# Patient Record
Sex: MALE | Race: WHITE | NOT HISPANIC OR LATINO | Employment: UNEMPLOYED | URBAN - METROPOLITAN AREA
[De-identification: names, ages, dates, MRNs, and addresses within clinical notes are randomized per-mention and may not be internally consistent; named-entity substitution may affect disease eponyms.]

---

## 2017-01-10 ENCOUNTER — OFFICE VISIT (OUTPATIENT)
Dept: MEDICAL GROUP | Facility: MEDICAL CENTER | Age: 57
End: 2017-01-10
Attending: NURSE PRACTITIONER
Payer: MEDICAID

## 2017-01-10 VITALS
BODY MASS INDEX: 22.02 KG/M2 | HEART RATE: 66 BPM | WEIGHT: 137 LBS | TEMPERATURE: 98.2 F | HEIGHT: 66 IN | RESPIRATION RATE: 18 BRPM | DIASTOLIC BLOOD PRESSURE: 80 MMHG | OXYGEN SATURATION: 97 % | SYSTOLIC BLOOD PRESSURE: 90 MMHG

## 2017-01-10 DIAGNOSIS — M12.812 LEFT ROTATOR CUFF TEAR ARTHROPATHY: ICD-10-CM

## 2017-01-10 DIAGNOSIS — M75.102 LEFT ROTATOR CUFF TEAR ARTHROPATHY: ICD-10-CM

## 2017-01-10 DIAGNOSIS — G89.4 CHRONIC PAIN SYNDROME: ICD-10-CM

## 2017-01-10 DIAGNOSIS — M25.532 WRIST PAIN, CHRONIC, LEFT: ICD-10-CM

## 2017-01-10 DIAGNOSIS — G89.29 WRIST PAIN, CHRONIC, LEFT: ICD-10-CM

## 2017-01-10 PROBLEM — M25.539 WRIST PAIN, CHRONIC: Status: ACTIVE | Noted: 2017-01-10

## 2017-01-10 PROCEDURE — 99213 OFFICE O/P EST LOW 20 MIN: CPT | Performed by: NURSE PRACTITIONER

## 2017-01-10 RX ORDER — PRAZOSIN HYDROCHLORIDE 2 MG/1
2 CAPSULE ORAL
Refills: 0 | COMMUNITY
Start: 2017-01-06

## 2017-01-10 ASSESSMENT — PATIENT HEALTH QUESTIONNAIRE - PHQ9: CLINICAL INTERPRETATION OF PHQ2 SCORE: 0

## 2017-01-10 NOTE — ASSESSMENT & PLAN NOTE
Seeing Dr Hubbard for neck and back.  Pt reports missed an appt with Physical Therapy and thus must wait again.  No more tx covered for shoulder, but has ongoing neck and back pain.  Had recent nerve testing.

## 2017-01-10 NOTE — ASSESSMENT & PLAN NOTE
"Pt reports he was told he has \"carpal tunnel\" in his left wrist.  By DR Hubbard nerve condution tests showed this left wrist carpal tunnel.  States will be seeing specialist in that office for carpal tunnel.   Wearing splint at night to left wrist. Had previous fx to that wrist from [facility].  "

## 2017-01-10 NOTE — PROGRESS NOTES
"  Chief Complaint: left wrist pain, \"carpal tunnel\", f/u on Depression- now established with Dr Villegas    HPI:  Gabriel presents to the clinic for follow up on multiple medical conditions.    His PMH includes    Anger, Depression  Sleep Apnea  Headaches  Seizure Disorder  Chronic Neck Pain  Degenerative Disc Disease- Neck  Ringing in the ears   Vitamin D Deficiency  Rotator Cuff Tear-Left Shoulder  Low Back Pain  Marijuana use    Established with Specialties:  Psychiatry---> [facility] and DR Villegas for Psychiatry.  Neurology--> Dr Nguyen  Orthopedics---> Dr Hubbard ([facility])  Neurosurgery in past- [facility] Neurosurgery    Review of Records show  7/5/16 Clinic Visit  7/25/16 DR Hubbard ([facility]) appt for Trigger Point Injection for C-spine pain  8/10/16 Neurology Appt w Dr Nguyen r/t Seizures. Continue Carbamazepine and Lamictal, MRI Brain r/t headaches, EEG at next visit  9/13/16 Clinic Visit, Pt referred to Psychiatric care  11/14/16 Re-referred to Neurology per Patient request as stated no longer seeing Dr Nguyen.---> [place] [facility]  12/5/16 Note from [facility]-Dr Chacon r/t left shoulder and C spine pain.     [place]  Report shows  2/24/16 Valium 5 mg # 15 DR Monico Oreilly  2/22/16 Norco 10/325 # 30 by me  1/12/16 Norco 10/325 # 20 by me  11/25/15 Norco 5/325 # 20 by me  11/18/15 Norco 5/325 # 15 by Dr Monico Oreilly  ------------------------------------------------------------------      Wrist pain, chronic  Pt reports he was told he has \"carpal tunnel\" in his left wrist.  By DR Hubbard nerve condution tests showed this left wrist carpal tunnel.  States will be seeing specialist in that office for carpal tunnel.   Wearing splint at night to left wrist. Had previous fx to that wrist from [facility].    Depression/anger issues  Pt reports he was placed on Prazosin by DR Villegas for nightmares.  Has not noticed any change.   Is taking Prozac and trazodone for sleep.  Pt has hx of PTSD, Anger issues and " "anxiety and depression.    Left rotator cuff tear arthropathy  Pt reports saw Dr Cota regarding his left rotator cuff tear and pathology  Pt reports he is to \"live with it or get surgery\". Pt reports it comes and goes with pain especially with lifting boxes to move.  Pt has not decided on surgry.    Chronic pain  Seeing Dr Hubbard for neck and back.  Pt reports missed an appt with Physical Therapy and thus must wait again.  No more tx covered for shoulder, but has ongoing neck and back pain.  Had recent nerve testing.      Patient Active Problem List    Diagnosis Date Noted   • Wrist pain, chronic 01/10/2017   • Animal bite 09/13/2016   • Skin lesion of face 09/13/2016   • Left rotator cuff tear arthropathy 03/07/2016   • Chronic pain 02/22/2016   • Acute pain of left shoulder 02/22/2016   • Stenosis, cervical spine 01/12/2016   • Pain of right hand 11/25/2015   • Chronic jaw pain 11/17/2015   • Vitamin D deficiency 04/01/2015   • Light headed 04/01/2015   • Ringing in ears 04/01/2015   • Chronic neck pain 04/01/2015   • Sore throat 03/17/2015   • Skin lesions, generalized 03/17/2015   • Seizures (HCC) 02/26/2015   • Headache 02/26/2015   • Sleep apnea 02/26/2015   • Depression 02/26/2015   • Anger 02/26/2015       Allergies:Review of patient's allergies indicates no known allergies.    Current Outpatient Prescriptions   Medication Sig Dispense Refill   • trazodone (DESYREL) 150 MG Tab TAKE 1 TABLET BY MOUTH TWICE A DAY 60 Tab 0   • meloxicam (MOBIC) 15 MG tablet Take 15 mg by mouth every day.     • lamotrigine (LAMICTAL) 200 MG tablet TAKE 1 & 1/2 TABLETS BY MOUTH 2 TIMES DAILY 90 Tab 3   • carbamazepine (TEGRETOL) 200 MG Tab Take 0.5 Tabs by mouth 3 times a day. 45 Tab 3   • fluoxetine (PROZAC) 40 MG capsule Take 1 Cap by mouth every day. 30 Cap 3   • cholecalciferol (VITAMIN D3) 5000 UNIT Cap Take one capsule every 28 days. 1 Cap 6   • ibuprofen (MOTRIN) 800 MG Tab Take 0.5 Tabs by mouth every 8 hours as " "needed for Mild Pain or Moderate Pain. 30 Tab 3   • prazosin (MINIPRESS) 2 MG Cap Take 2 mg by mouth.  0     No current facility-administered medications for this visit.       Social History   Substance Use Topics   • Smoking status: Former Smoker -- 1.00 packs/day for 10 years     Quit date: 01/01/1982   • Smokeless tobacco: Never Used   • Alcohol Use: Yes      Comment: 3x per week, 2-3 beers        Family History   Problem Relation Age of Onset   • Cancer Mother    • Cancer Father    • Psychiatry Maternal Grandfather        ROS:  Review of Systems   See HPI Above    Exam:  Blood pressure 90/80, pulse 66, temperature 36.8 °C (98.2 °F), resp. rate 18, height 1.676 m (5' 5.98\"), weight 62.143 kg (137 lb), SpO2 97 %.  General:  Well nourished, well developed male in NAD  HENT:Head is grossly normal. PERRL.  Neck: Supple. Trachea is midline.  Pulmonary: Clear to ausculation .  Normal effort. No rales, ronchi, or wheezing.   Cardiovascular: Regular rate and rhythm.  Abdomen-Abdomen is soft, No tenderness.  Upper extremities- Strong = . Good ROM  Lower extremities- neg for edema, redness, tenderness.  Neuro- A & O x 4. Speech clear and appropriate.     Current medications, allergies, and problem list reviewed with patient and updated in  [facility] today.    Assessment/Plan:  1. Wrist pain, chronic, left  Wear night splint per Dr Hubbard.   2. Left rotator cuff tear arthropathy  Continue with Dr Cota   3. Chronic pain syndrome  Pt to continue w [facility] SWENSON's, Motrin as discussed. Do not take same day as Mobic.    4.      Depression                                                               Continue meds per Dr Villegas (Psychiatry)    Follow up in 3 months. Call or return if questions, concerns, or worsening condition.  "

## 2017-01-10 NOTE — ASSESSMENT & PLAN NOTE
Pt reports he was placed on Prazosin by DR Villegas for nightmares.  Has not noticed any change.   Is taking Prozac and trazodone for sleep.  Pt has hx of PTSD, Anger issues and anxiety and depression.

## 2017-01-10 NOTE — MR AVS SNAPSHOT
"        Gabriel Angel   1/10/2017 2:30 PM   Office Visit   MRN: [id number]    Department[facility]  [facility]   Dept Phone:  [phone]    Description:  Male : 1960   Provider:  FADY Wright           Reason for Visit     Carpal Tunnel left      Allergies as of 1/10/2017     No Known Allergies      You were diagnosed with     Wrist pain, chronic, left   [084142]       Left rotator cuff tear arthropathy   [5013838]       Chronic pain syndrome   [338.4.ICD-9-CM]         Vital Signs     Blood Pressure Pulse Temperature Respirations Height Weight    90/80 mmHg 66 36.8 °C (98.2 °F) 18 1.676 m (5' 5.98\") 62.143 kg (137 lb)    Body Mass Index Oxygen Saturation Smoking Status             22.12 kg/m2 97% Former Smoker         Basic Information     Date Of Birth Sex Race Ethnicity Preferred Language    1960 Male White Non- English      Problem List              ICD-10-CM Priority Class Noted - Resolved    Seizures (HCC) R56.9   2015 - Present    Headache R51   2015 - Present    Sleep apnea G47.30   2015 - Present    Depression F32.9   2015 - Present    Anger R45.4   2015 - Present    Sore throat J02.9   3/17/2015 - Present    Skin lesions, generalized L98.9   3/17/2015 - Present    Vitamin D deficiency E55.9   2015 - Present    Light headed R42   2015 - Present    Ringing in ears H93.19   2015 - Present    Chronic neck pain M54.2, G89.29   2015 - Present    Chronic jaw pain R68.84, G89.29   2015 - Present    Pain of right hand M79.641   2015 - Present    Stenosis, cervical spine M48.02   2016 - Present    Chronic pain G89.29   2016 - Present    Acute pain of left shoulder M25.512   2016 - Present    Left rotator cuff tear arthropathy M12.812   3/7/2016 - Present    Animal bite T14.8   2016 - Present    Skin lesion of face L98.9   2016 - Present    Wrist pain, chronic M25.539, G89.29   1/10/2017 - Present      Health " Maintenance        Date Due Completion Dates    IMM INFLUENZA (1) 9/1/2016 ---    COLON CANCER SCREENING ANNUAL FIT 4/22/2017 4/22/2016, 3/6/2015    IMM DTaP/Tdap/Td Vaccine (2 - Td) 9/13/2026 9/13/2016            Current Immunizations     Tdap Vaccine 9/13/2016      Below and/or attached are the medications your provider expects you to take. Review all of your home medications and newly ordered medications with your provider and/or pharmacist. Follow medication instructions as directed by your provider and/or pharmacist. Please keep your medication list with you and share with your provider. Update the information when medications are discontinued, doses are changed, or new medications (including over-the-counter products) are added; and carry medication information at all times in the event of emergency situations     Allergies:  No Known Allergies          Medications  Valid as of: January 10, 2017 -  3:00 PM    Generic Name Brand Name Tablet Size Instructions for use    CarBAMazepine (Tab) TEGRETOL 200 MG Take 0.5 Tabs by mouth 3 times a day.        Cholecalciferol (Cap) VITAMIN D3 5000 UNIT Take one capsule every 28 days.        FLUoxetine HCl (Cap) PROZAC 40 MG Take 1 Cap by mouth every day.        Ibuprofen (Tab) MOTRIN 800 MG Take 0.5 Tabs by mouth every 8 hours as needed for Mild Pain or Moderate Pain.        LamoTRIgine (Tab) LAMICTAL 200 MG TAKE 1 & 1/2 TABLETS BY MOUTH 2 TIMES DAILY        Meloxicam (Tab) MOBIC 15 MG Take 15 mg by mouth every day.        Prazosin HCl (Cap) MINIPRESS 2 MG Take 2 mg by mouth.        TraZODone HCl (Tab) DESYREL 150 MG TAKE 1 TABLET BY MOUTH TWICE A DAY        .                 Medicines prescribed today were sent to:     [facility] - [place] - [address]    [address] [place] [address]    Phone: [phone] Fax: [phone]    Open 24 Hours?: No    [id number] - [place] [address]    [address] [address]    Phone:  [phone] Fax: [phone]    Open 24 Hours?: No      Medication refill instructions:       If your prescription bottle indicates you have medication refills left, it is not necessary to call your provider’s office. Please contact your pharmacy and they will refill your medication.    If your prescription bottle indicates you do not have any refills left, you may request refills at any time through one of the following ways: The online Personaling system (except Urgent Care), by calling your provider’s office, or by asking your pharmacy to contact your provider’s office with a refill request. Medication refills are processed only during regular business hours and may not be available until the next business day. Your provider may request additional information or to have a follow-up visit with you prior to refilling your medication.   *Please Note: Medication refills are assigned a new Rx number when refilled electronically. Your pharmacy may indicate that no refills were authorized even though a new prescription for the same medication is available at the pharmacy. Please request the medicine by name with the pharmacy before contacting your provider for a refill.        Other Notes About Your Plan     12/5/16 scan from [facility]-Dr Cota r/t left shoulder and C-spine. See note  10/6/16 Dr Hubbard ([facility]) f/u on neck and shoulder pain.   8/10/16 Neurology-DR Nguyen appt , r/t seizures and headaches,  continue Carbamazepine and Lamictal, MRI brain r/t headaches, EEG at next visit  7/25/16 Dr Hubbard [facility] appt- Neck pain, Elected to do Trigger Point injections  6/13/16 Note from Physical Therapy- PT completed.  2/22/16 UDS Pos for Marijuana, Alcohol, and morphine derivatives. Pt warned on 2/22 continued use would nullify contract. Needs another UDS.  2/17/16 [facility] Neurosurgery consult for fingertip pain and Degen Disc Disease in C-spine. Findings suggest peripheral neuropathy , non surgical. Consider pain  management/neurolgy f/u.  12/8/15 Refill for Lamictal here. At next visit must check w Pt if he has re-established with Neurology? May need re-referral  [facility]- Therapist Asael Guzman ([email or web address], [phone])  3/30/15 - Pt calls and reports Vit D 50,000 units rx denied, and he purchased otc Vitamin D.  Neurology- Dr Ruffin -First Appt 3/11/15- Reportedly rescheduled by the office for future.           MyChart Status: Patient Declined

## 2017-01-10 NOTE — ASSESSMENT & PLAN NOTE
"Pt reports saw Dr Cota regarding his left rotator cuff tear and pathology  Pt reports he is to \"live with it or get surgery\". Pt reports it comes and goes with pain especially with lifting boxes to move.  Pt has not decided on surgry.  "

## 2017-01-16 DIAGNOSIS — R56.9 CONVULSIONS, UNSPECIFIED CONVULSION TYPE (HCC): ICD-10-CM

## 2017-01-16 DIAGNOSIS — R56.9 SEIZURES (HCC): ICD-10-CM

## 2017-01-16 RX ORDER — LAMOTRIGINE 200 MG/1
TABLET ORAL
Qty: 90 TAB | Refills: 3 | Status: SHIPPED | OUTPATIENT
Start: 2017-01-16 | End: 2017-01-22 | Stop reason: SDUPTHER

## 2017-01-16 NOTE — TELEPHONE ENCOUNTER
Was the patient seen in the last year in this department? Yes     Does patient have an active prescription for medications requested? No     Received Request Via: Pharmacy

## 2017-01-23 RX ORDER — LAMOTRIGINE 200 MG/1
TABLET ORAL
Qty: 90 TAB | Refills: 2 | Status: SHIPPED | OUTPATIENT
Start: 2017-01-23 | End: 2017-04-22 | Stop reason: SDUPTHER

## 2017-03-24 ENCOUNTER — APPOINTMENT (OUTPATIENT)
Dept: RADIOLOGY | Facility: MEDICAL CENTER | Age: 57
End: 2017-03-24
Attending: PHYSICAL MEDICINE & REHABILITATION
Payer: MEDICAID

## 2017-03-24 DIAGNOSIS — M50.33 DEGENERATION OF CERVICOTHORACIC INTERVERTEBRAL DISC: ICD-10-CM

## 2017-03-24 PROCEDURE — 72141 MRI NECK SPINE W/O DYE: CPT

## 2017-04-24 RX ORDER — LAMOTRIGINE 200 MG/1
TABLET ORAL
Qty: 90 TAB | Refills: 0 | Status: SHIPPED | OUTPATIENT
Start: 2017-04-24 | End: 2017-09-05

## 2017-09-05 DIAGNOSIS — R56.9 SEIZURES (HCC): ICD-10-CM

## 2017-09-05 DIAGNOSIS — R56.9 CONVULSIONS, UNSPECIFIED CONVULSION TYPE (HCC): ICD-10-CM

## 2017-09-05 RX ORDER — LAMOTRIGINE 200 MG/1
TABLET ORAL
Qty: 90 TAB | Refills: 0 | Status: SHIPPED | OUTPATIENT
Start: 2017-09-05 | End: 2017-10-27 | Stop reason: SDUPTHER

## 2021-03-15 DIAGNOSIS — Z23 NEED FOR VACCINATION: ICD-10-CM

## 2022-04-07 ENCOUNTER — APPOINTMENT (OUTPATIENT)
Dept: RADIOLOGY | Facility: MEDICAL CENTER | Age: 62
End: 2022-04-07
Attending: EMERGENCY MEDICINE

## 2022-04-07 ENCOUNTER — HOSPITAL ENCOUNTER (EMERGENCY)
Facility: MEDICAL CENTER | Age: 62
End: 2022-04-07
Attending: EMERGENCY MEDICINE

## 2022-04-07 VITALS
HEIGHT: 63 IN | BODY MASS INDEX: 23.32 KG/M2 | TEMPERATURE: 99.1 F | WEIGHT: 131.61 LBS | HEART RATE: 63 BPM | RESPIRATION RATE: 18 BRPM | OXYGEN SATURATION: 99 % | DIASTOLIC BLOOD PRESSURE: 74 MMHG | SYSTOLIC BLOOD PRESSURE: 115 MMHG

## 2022-04-07 DIAGNOSIS — S49.91XA INJURY OF RIGHT SHOULDER, INITIAL ENCOUNTER: ICD-10-CM

## 2022-04-07 PROCEDURE — 700102 HCHG RX REV CODE 250 W/ 637 OVERRIDE(OP): Performed by: EMERGENCY MEDICINE

## 2022-04-07 PROCEDURE — 99284 EMERGENCY DEPT VISIT MOD MDM: CPT

## 2022-04-07 PROCEDURE — 73030 X-RAY EXAM OF SHOULDER: CPT | Mod: RT

## 2022-04-07 PROCEDURE — A9270 NON-COVERED ITEM OR SERVICE: HCPCS | Performed by: EMERGENCY MEDICINE

## 2022-04-07 RX ORDER — IBUPROFEN 600 MG/1
600 TABLET ORAL ONCE
Status: COMPLETED | OUTPATIENT
Start: 2022-04-07 | End: 2022-04-07

## 2022-04-07 RX ADMIN — IBUPROFEN 600 MG: 600 TABLET ORAL at 15:53

## 2022-04-07 NOTE — ED PROVIDER NOTES
"ED Provider Note    CHIEF COMPLAINT  Chief Complaint   Patient presents with   • Shoulder Injury     X 2 days,  denied trauma to arm but has overused it the last couple of days        HPI  Gabriel Angel is a 61 y.o. male who presents to the emergency department with left shoulder pain.  Started 2 days ago.  He fell down 4 days ago and the pain started after.  He has also been lifting a lot of boxes moving.  Doing a lot of overhead work.  He has a history of recurrent shoulder pain and recurrent right shoulder dislocation.  He has not noticed any swelling or fever.  Denies weakness or numbness.  He cannot extend the shoulder or abductor shoulder.  He took Aleve this morning without improvement.    REVIEW OF SYSTEMS  As per HPI, otherwise a 10 point review of systems is negative    PAST MEDICAL HISTORY  Past Medical History:   Diagnosis Date   • Depression    • Headache(784.0)    • Seizure (HCC)        SOCIAL HISTORY  Social History     Tobacco Use   • Smoking status: Former Smoker     Packs/day: 1.00     Years: 10.00     Pack years: 10.00     Quit date: 1982     Years since quittin.2   • Smokeless tobacco: Never Used   Substance Use Topics   • Alcohol use: Yes     Comment: 3x per week, 2-3 beers    • Drug use: Yes     Types: Marijuana     Comment: \"pot\"       SURGICAL HISTORY  Past Surgical History:   Procedure Laterality Date   • APPENDECTOMY     • ORIF, WRIST             CURRENT MEDICATIONS  Home Medications     Reviewed by Jacqui Baltazar R.N. (Registered Nurse) on 22 at 1415  Med List Status: <None>   Medication Last Dose Status   carbamazepine (TEGRETOL) 200 MG Tab  Active   cholecalciferol (VITAMIN D3) 5000 UNIT Cap  Active   fluoxetine (PROZAC) 40 MG capsule  Active   ibuprofen (MOTRIN) 800 MG Tab  Active   lamotrigine (LAMICTAL) 200 MG tablet  Active   lamotrigine (LAMICTAL) 200 MG tablet  Active   meloxicam (MOBIC) 15 MG tablet  Active   prazosin (MINIPRESS) 2 MG Cap  Active " "  trazodone (DESYREL) 150 MG Tab  Active                ALLERGIES  No Known Allergies    PHYSICAL EXAM  VITAL SIGNS: /63   Pulse 74   Temp 37.4 °C (99.4 °F) (Temporal)   Resp 16   Ht 1.6 m (5' 3\")   Wt 59.7 kg (131 lb 9.8 oz)   SpO2 96%   BMI 23.31 kg/m²    Constitutional: Awake and alert  HENT: Normal inspection  Eyes: Normal inspection  Neck: Grossly normal range of motion.  Cardiovascular: Normal heart rate  Thorax & Lungs: No respiratory distress  Skin: No rash.  Extremities: There is no joint effusion of the right shoulder.  There is limited range of motion actively, but no significant limitation passively.  No crepitus or deformity.  Neurologic: Grossly normal   Psychiatric: Normal for situation    RADIOLOGY/PROCEDURES  DX-SHOULDER 2+ RIGHT   Final Result         1. No acute osseous abnormality.           Imaging is interpreted by radiologist      COURSE & MEDICAL DECISION MAKING  Patient presents with right shoulder pain.  Describes trauma falling on his right shoulder extending the shoulder a few days ago.  Ordered x-ray which is negative.  He does not have fever or skin rash.  No suggestion of cellulitis or septic arthritis.  I suspect rotator cuff injury or tendinitis.  He will be started on scheduled NSAIDs ibuprofen 400 mg 3 times a day.  Placed in a sling for comfort.  He was given advice regarding range of motion exercises to prevent frozen shoulder.  He will be referred to orthopedics, Dr. Dial.  Return to ER for uncontrolled pain, fever, skin rash or concern.      FINAL IMPRESSION  1.  Right shoulder pain, rotator cuff injury      This dictation was created using voice recognition software. The accuracy of the dictation is limited to the abilities of the software.  The nursing notes were reviewed and certain aspects of this information were incorporated into this note.      Electronically signed by: Paul Hunt M.D., 4/7/2022 2:53 PM    "

## 2022-04-07 NOTE — ED NOTES
Dc instructions reviewed with pt and dtr. To f/u with ortho,  Ice,sling, otc meds for pain ,return for worsening s/s

## 2022-04-07 NOTE — ED TRIAGE NOTES
"Chief Complaint   Patient presents with   • Shoulder Injury     X 2 days,  denied trauma to arm but has overused it the last couple of days        /63   Pulse 74   Temp 37.4 °C (99.4 °F) (Temporal)   Resp 16   Ht 1.6 m (5' 3\")   Wt 59.7 kg (131 lb 9.8 oz)   SpO2 96%      Has this patient been vaccinated for COVID no  If not, would they like to be vaccinated while in the ER if eligible?  no  Would the patient like to speak with the ERP about the possibility of receiving the COVID vaccine today before making a decision? no    "